# Patient Record
Sex: MALE | Race: WHITE | Employment: OTHER | URBAN - NONMETROPOLITAN AREA
[De-identification: names, ages, dates, MRNs, and addresses within clinical notes are randomized per-mention and may not be internally consistent; named-entity substitution may affect disease eponyms.]

---

## 2022-10-07 ENCOUNTER — HOSPITAL ENCOUNTER (OUTPATIENT)
Age: 27
Discharge: HOME | End: 2022-10-07
Payer: COMMERCIAL

## 2022-10-07 ENCOUNTER — OFFICE VISIT (OUTPATIENT)
Dept: PRIMARY CARE CLINIC | Age: 27
End: 2022-10-07
Payer: COMMERCIAL

## 2022-10-07 VITALS
RESPIRATION RATE: 18 BRPM | TEMPERATURE: 98.5 F | HEART RATE: 95 BPM | BODY MASS INDEX: 27.9 KG/M2 | OXYGEN SATURATION: 97 % | SYSTOLIC BLOOD PRESSURE: 136 MMHG | WEIGHT: 188.38 LBS | HEIGHT: 69 IN | DIASTOLIC BLOOD PRESSURE: 82 MMHG

## 2022-10-07 DIAGNOSIS — R10.30 INGUINAL PAIN, UNSPECIFIED LATERALITY: ICD-10-CM

## 2022-10-07 DIAGNOSIS — R10.2 PERINEAL PAIN IN MALE: Primary | ICD-10-CM

## 2022-10-07 LAB
BACTERIA: NORMAL
BILIRUBIN URINE: NEGATIVE
EPITHELIAL CELLS UA: NORMAL /HPF (ref 0–5)
GLUCOSE URINE: NEGATIVE
KETONES, URINE: NEGATIVE
LEUKOCYTE ESTERASE, URINE: NEGATIVE
NITRITE, URINE: NEGATIVE
PH UA: 6 (ref 5–6)
PROTEIN UA: NEGATIVE
RBC UA: NORMAL /HPF (ref 0–4)
REASON FOR REJECTION: NORMAL
SPECIFIC GRAVITY UA: 1.01 (ref 1.01–1.02)
URINE HGB: NEGATIVE
UROBILINOGEN, URINE: NORMAL
WBC UA: NORMAL /HPF (ref 0–4)
ZZ NTE CLEAN UP: ORDERED TEST: NORMAL
ZZ NTE WITH NAME CLEAN UP: SPECIMEN SOURCE: NORMAL

## 2022-10-07 PROCEDURE — 87491 CHLMYD TRACH DNA AMP PROBE: CPT

## 2022-10-07 PROCEDURE — 87591 N.GONORRHOEAE DNA AMP PROB: CPT

## 2022-10-07 PROCEDURE — 99213 OFFICE O/P EST LOW 20 MIN: CPT | Performed by: FAMILY MEDICINE

## 2022-10-07 PROCEDURE — 81001 URINALYSIS AUTO W/SCOPE: CPT

## 2022-10-07 RX ORDER — CIPROFLOXACIN 500 MG/1
500 TABLET, FILM COATED ORAL 2 TIMES DAILY
Qty: 14 TABLET | Refills: 0 | Status: SHIPPED | OUTPATIENT
Start: 2022-10-07 | End: 2022-10-14

## 2022-10-07 RX ORDER — NAPROXEN 500 MG/1
500 TABLET ORAL 2 TIMES DAILY PRN
Qty: 20 TABLET | Refills: 0 | Status: SHIPPED | OUTPATIENT
Start: 2022-10-07

## 2022-10-07 NOTE — PROGRESS NOTES
[address]  [address], [address] [place]  [phone]      Oren Gallagher is a 24 y.o. male who is c/o of Groin Pain (Started Wednesday. Feels more like a pressure. Denies testicle swelling, testicle not painful to the touch. )      HPI:     HPI  Care assumed from [facility] has had groin pain/pressure x past 2 days      Subjective:      Past Medical History:   Diagnosis Date    PONV (postoperative nausea and vomiting)       Past Surgical History:   Procedure Laterality Date    KNEE ARTHROSCOPY Left 12/16/2016    KNEE ARTHROSCOPY Left 12/16/2016    TONSILLECTOMY      WISDOM TOOTH EXTRACTION         Social History     Tobacco Use    Smoking status: Never     Passive exposure: Never    Smokeless tobacco: Never   Substance Use Topics    Alcohol use: No      Current Outpatient Medications   Medication Sig Dispense Refill    naproxen (NAPROSYN) 500 MG tablet Take 1 tablet by mouth 2 times daily as needed for Pain 20 tablet 0     No current facility-administered medications for this visit. No Known Allergies    Review of Systems   Genitourinary:  Negative for difficulty urinating, dysuria, genital sores and penile pain. Objective:     Vitals:    10/07/22 1638   BP: 136/82   Site: Left Upper Arm   Position: Sitting   Cuff Size: Medium Adult   Pulse: 95   Resp: 18   Temp: 98.5 °F (36.9 °C)   TempSrc: Tympanic   SpO2: 97%   Weight: 188 lb 6 oz (85.4 kg)   Height: 5' 9\" (1.753 m)     Physical Exam  Constitutional:       General: He is not in acute distress. Appearance: Normal appearance. HENT:      Head: Normocephalic and atraumatic. Cardiovascular:      Rate and Rhythm: Normal rate and regular rhythm. Heart sounds: Normal heart sounds. Pulmonary:      Effort: Pulmonary effort is normal. No respiratory distress. Breath sounds: Normal breath sounds. Neurological:      General: No focal deficit present.       Mental Status: He is alert and oriented to person, place, and time. Psychiatric:         Mood and Affect: Mood normal.       Assessment:       Diagnosis Orders   1. Perineal pain in male  ciprofloxacin (CIPRO) 500 MG tablet    naproxen (NAPROSYN) 500 MG tablet      2. Inguinal pain, unspecified laterality  C.trachomatis N.gonorrhoeae DNA, Urine    Urinalysis with Reflex to Culture          Plan:      Return if symptoms worsen or fail to improve in 2-3 days. Orders Placed This Encounter   Procedures    C.trachomatis N.gonorrhoeae DNA, Urine     Standing Status:   Future     Number of Occurrences:   1     Standing Expiration Date:   10/8/2023    Urinalysis with Reflex to Culture     Standing Status:   Future     Number of Occurrences:   1     Standing Expiration Date:   10/7/2023     Order Specific Question:   SPECIFY(EX-CATH,MIDSTREAM,CYSTO,ETC)? Answer:   MIDSTREAM     Orders Placed This Encounter   Medications    ciprofloxacin (CIPRO) 500 MG tablet     Sig: Take 1 tablet by mouth 2 times daily for 7 days     Dispense:  14 tablet     Refill:  0    naproxen (NAPROSYN) 500 MG tablet     Sig: Take 1 tablet by mouth 2 times daily as needed for Pain     Dispense:  20 tablet     Refill:  0       Patient given educational materials - see patient instructions. Discussed use, benefit, and side effects of prescribed medications. All patient questions answered. Pt voiced understanding.        Electronically signed by Zachary Serrano DO, DO on 11/7/2022 at 12:12 AM

## 2022-10-07 NOTE — PROGRESS NOTES
[address]  [address] [facility], [id number]  [phone]      HPI:     Groin Pain  The patient's pertinent negatives include no genital injury, genital itching, genital lesions, penile discharge, penile pain, scrotal swelling or testicular pain. This is a new problem. The current episode started in the past 7 days. The problem occurs daily. The problem has been unchanged. The pain is mild (pressure). Pertinent negatives include no chest pain, constipation, coughing, diarrhea, discolored urine, dysuria, fever, headaches, hematuria, shortness of breath, urgency or urinary retention. There is No reported injury. Nothing aggravates the symptoms. He has tried nothing for the symptoms. The treatment provided no relief. He is sexually active. He never uses condoms. No, his partner does not have an STD. There is no history of chlamydia, gonorrhea or syphilis. Current Outpatient Medications   Medication Sig Dispense Refill    ciprofloxacin (CIPRO) 500 MG tablet Take 1 tablet by mouth 2 times daily for 7 days 14 tablet 0    naproxen (NAPROSYN) 500 MG tablet Take 1 tablet by mouth 2 times daily as needed for Pain 20 tablet 0     No current facility-administered medications for this visit. No Known Allergies    All patients pastmedical, surgical, social and family history has been reviewed. Subjective:      Review of Systems   Constitutional:  Negative for activity change, appetite change, fatigue and fever. Respiratory:  Negative for cough, shortness of breath and wheezing. Cardiovascular:  Negative for chest pain and palpitations. Gastrointestinal:  Negative for constipation and diarrhea. Genitourinary:  Negative for dysuria, penile discharge, penile pain, scrotal swelling, testicular pain and urgency. Neurological:  Negative for headaches. Objective:      Physical Exam  Vitals and nursing note reviewed. Constitutional:       Appearance: Normal appearance. HENT:      Head: Normocephalic and atraumatic. Abdominal:      Hernia: There is no hernia in the left inguinal area or right inguinal area. Genitourinary:     Penis: Normal and circumcised. No tenderness or discharge. Testes: Normal.      Comments: Pain with palpation behind the scrotum  Neurological:      Mental Status: He is alert. Assessment:       Diagnosis Orders   1. Perineal pain in male  ciprofloxacin (CIPRO) 500 MG tablet    naproxen (NAPROSYN) 500 MG tablet      2. Inguinal pain, unspecified laterality  C.trachomatis N.gonorrhoeae DNA, Urine    Urinalysis with Reflex to Culture          Plan:      UA ordered along with GC/Chlamydia   Passed care of patient to Dr. Lisbeth Hudson who was oncoming today. Return if symptoms worsen or fail to improve in 2-3 days. Orders Placed This Encounter   Procedures    C.trachomatis N.gonorrhoeae DNA, Urine     Standing Status:   Future     Number of Occurrences:   1     Standing Expiration Date:   10/8/2023    Urinalysis with Reflex to Culture     Standing Status:   Future     Number of Occurrences:   1     Standing Expiration Date:   10/7/2023     Order Specific Question:   SPECIFY(EX-CATH,MIDSTREAM,CYSTO,ETC)? Answer:   MIDSTREAM     Orders Placed This Encounter   Medications    ciprofloxacin (CIPRO) 500 MG tablet     Sig: Take 1 tablet by mouth 2 times daily for 7 days     Dispense:  14 tablet     Refill:  0    naproxen (NAPROSYN) 500 MG tablet     Sig: Take 1 tablet by mouth 2 times daily as needed for Pain     Dispense:  20 tablet     Refill:  0       Patient given educational materials - see patient instructions. All patient questionsanswered. Pt voiced understanding. Reviewed health maintenance.      Electronically signed by LUCRECIA Sneed CNP, CNP on 10/8/2022 at 2:17 PM

## 2022-10-08 ASSESSMENT — ENCOUNTER SYMPTOMS
CONSTIPATION: 0
COUGH: 0
SHORTNESS OF BREATH: 0
WHEEZING: 0
DIARRHEA: 0

## 2022-10-10 LAB
C. TRACHOMATIS DNA ,URINE: NEGATIVE
N. GONORRHOEAE DNA, URINE: NEGATIVE
SPECIMEN DESCRIPTION: NORMAL

## 2024-02-17 ENCOUNTER — OFFICE VISIT (OUTPATIENT)
Dept: PRIMARY CARE CLINIC | Age: 29
End: 2024-02-17
Payer: COMMERCIAL

## 2024-02-17 VITALS
WEIGHT: 185 LBS | OXYGEN SATURATION: 97 % | HEART RATE: 80 BPM | SYSTOLIC BLOOD PRESSURE: 128 MMHG | TEMPERATURE: 98.5 F | BODY MASS INDEX: 27.4 KG/M2 | DIASTOLIC BLOOD PRESSURE: 80 MMHG | HEIGHT: 69 IN | RESPIRATION RATE: 16 BRPM

## 2024-02-17 DIAGNOSIS — J02.9 SORE THROAT: ICD-10-CM

## 2024-02-17 DIAGNOSIS — J02.9 PHARYNGITIS, UNSPECIFIED ETIOLOGY: ICD-10-CM

## 2024-02-17 DIAGNOSIS — H66.002 NON-RECURRENT ACUTE SUPPURATIVE OTITIS MEDIA OF LEFT EAR WITHOUT SPONTANEOUS RUPTURE OF TYMPANIC MEMBRANE: Primary | ICD-10-CM

## 2024-02-17 LAB — S PYO AG THROAT QL: NORMAL

## 2024-02-17 PROCEDURE — 99213 OFFICE O/P EST LOW 20 MIN: CPT

## 2024-02-17 PROCEDURE — 87880 STREP A ASSAY W/OPTIC: CPT

## 2024-02-17 RX ORDER — M-VIT,TX,IRON,MINS/CALC/FOLIC 27MG-0.4MG
1 TABLET ORAL DAILY
COMMUNITY

## 2024-02-17 RX ORDER — AMOXICILLIN 500 MG/1
500 CAPSULE ORAL 2 TIMES DAILY
Qty: 20 CAPSULE | Refills: 0 | Status: SHIPPED | OUTPATIENT
Start: 2024-02-17 | End: 2024-02-27

## 2024-02-17 RX ORDER — VITAMIN B COMPLEX
1000 TABLET ORAL DAILY
COMMUNITY

## 2024-02-17 NOTE — PROGRESS NOTES
[facility]  [address] [facility] [address]  Phone: [phone]  Fax: [phone]      Moe Carvajal is a 28 y.o. male who presents to the [facility] today for his medical conditions/complaints as noted below. Moe Carvajal is c/o of URI (Pt ill 3 days with body aches, headache and sore throat. )          HPI:     URI   This is a new problem. The current episode started in the past 7 days (x3 days). The problem has been gradually worsening. There has been no fever (unmeasured). Associated symptoms include congestion, headaches, a sore throat and swollen glands. Pertinent negatives include no coughing, diarrhea, ear pain, nausea, rhinorrhea or vomiting. He has tried NSAIDs for the symptoms. The treatment provided mild relief.       Past Medical History:   Diagnosis Date    PONV (postoperative nausea and vomiting)         No Known Allergies    Wt Readings from Last 3 Encounters:   02/17/24 83.9 kg (185 lb)   10/07/22 85.4 kg (188 lb 6 oz)   12/16/16 86.2 kg (190 lb) (89 %, Z= 1.21)*     * Growth percentiles are based on CDC (Boys, 2-20 Years) data.     BP Readings from Last 3 Encounters:   02/17/24 128/80   10/07/22 136/82   12/16/16 128/78      Temp Readings from Last 3 Encounters:   02/17/24 98.5 °F (36.9 °C) (Tympanic)   10/07/22 98.5 °F (36.9 °C) (Tympanic)   12/16/16 97.5 °F (36.4 °C)     Pulse Readings from Last 3 Encounters:   02/17/24 80   10/07/22 95   12/16/16 90     SpO2 Readings from Last 3 Encounters:   02/17/24 97%   10/07/22 97%   12/16/16 95%       Subjective:      Review of Systems   Constitutional:  Negative for appetite change, fatigue and fever.   HENT:  Positive for congestion and sore throat. Negative for ear pain, rhinorrhea and sinus pressure.    Respiratory:  Negative for cough.    Gastrointestinal:  Negative for diarrhea, nausea and vomiting.   Neurological:  Positive for headaches.       Objective:  You can access the FollowMyHealth Patient Portal offered by [facility] by registering at the following website: [email or web address]. By joining [facility]’s FollowMyHealth portal, you will also be able to view your health information using other applications (apps) compatible with our system.